# Patient Record
Sex: MALE | Race: WHITE | NOT HISPANIC OR LATINO | Employment: FULL TIME | URBAN - METROPOLITAN AREA
[De-identification: names, ages, dates, MRNs, and addresses within clinical notes are randomized per-mention and may not be internally consistent; named-entity substitution may affect disease eponyms.]

---

## 2021-08-03 NOTE — PROGRESS NOTES
Marciano Burden is a 50 year old who is being evaluated via a billable video visit.      How would you like to obtain your AVS? Mail a copy  If the video visit is dropped, the invitation should be resent by: Send to e-mail at: [email or web address]  Will anyone else be joining your video visit? No    Does patient have any form of state insurance? No    Do you have wifi? Yes  Do you have a smart phone? Yes  Can you download an hallie on your phone comfortably with out assistance? Yes  Can you watch a [employer] video? Yes          Nila Sosa MA    Video-Visit Details    Type of service:  Video Visit  Video Start Time: 11:05 AM  Video End Time: 11:55 AM  Originating Location (pt. Location): Home    Distant Location (provider location): [facility]   Platform used for Video Visit: [employer]       [facility]  Sleep Consultation Note     Date on this visit: 8/4/2021    Marciano Burden is sent by No ref. provider found for a sleep consultation regarding abnormal movements during sleep.    Primary Physician: No Ref-Primary, Physician     Chief complaint: Leg movements, body jerking, leg cramps    Marciano Burden 50 year old male                   who presents for evaluation and management of abnormal movements during sleep.  His girlfriend was also present during the sleep clinic visit.    He was previously being followed through the [facility].  He underwent sleep studies through [facility] at [facility] He was last seen by Dr. Aurelio Alvarez at the [facility] on December 21, 2020 to discuss the polysomnogram results.    SLEEP STUDY SUMMARY:  Full night treatment polysomnogram was a therapeutic trial of nasal CPAP delivered through a [employer]  full face mask interface (only one used during COVID-19 pandemic). Obstructive sleep disordered breathing and snoring were well controlled utilizing a terminal nasal CPAP pressure of 6 cm water, including  26 minutes of sampled supine REM sleep. Total sleep time was 377.5 minutes, with sleep efficiency of 80.2 %.  Frequent periodic leg movements of sleep with substantial arousal tendency were noted. 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    CLINICAL INTERPRETATION:  Satisfactory trial of nasal CPAP at 6 cm water. Frequent periodic leg movements of sleep with substantial arousal tendency, which in the clinical context of the patient's disturb sleep quality, could support a diagnosis of periodic limb movement disorder. Isolated REM sleep atonia loss (REM sleep without atonia) of uncertain clinical significance.    Isolated REM sleep without atonia was noted during the sleep study, but did not meet the standard for diagnosis of REM sleep behavior disorders since he did not have a clear-cut dream enactment behaviors and his partner also verified it.  With  symptom of intermittent left arm tremor and findings by previous providers including Dr. Alvarez, there was no clear evidence of parkinsonism and there was only a mild postural tremor for which he was suggested to follow up with neurology on annual basis.    He was offered therapeutic trial of pramipexole based on the symptoms of nonrestorative sleep quality and excessive PLM's during the sleep study, that could reflect a correlate of PLMD.    Due to concern about mild degree of chronic sleep insufficiency,  he has also suggested sleep expansion since he had reported habitually getting 7 hours of sleep during the week.    He reports  body twitching on and off all night.  He reports his arms and legs jerk, it has been getting worse lately.  The leg tenses up and he starts kicking.  Blankets  on his bed are disheveled.  There are no triggering factors for the symptoms.  He also describes waves of achiness through  his lower extremity from above the knee to the arches of the feet, leading to cramping in the feet sometimes , that he needs to stretch out his feet and to help relieve the cramp.  He reports uncontrolled tremors/muscle spasm.  He goes to bed around 10 PM and the symptoms usually start approximately 15 minutes after he goes to bed, but  sometimes it can be  longer than 15 minutes. He needs to constantly move and stretch out his legs. He cannot stay still when watching TV. Sometimes he gets out of the bed and walk, which occasionally helps .  Patient reports that he has tried dozens of different medications including muscle relaxants.  Muscle relaxants are somewhat helpful.  He was prescribed pramipexole by Dr. Aurelio Alvarez, for PLMD. He tried pramipexole, dose up to 3 tablets of the 0.125 mg, did not notice any change using it for 6 weeks and hence he discontinued the medication.  He tried trazodone that did not help. He tried gabapentin that did not make any difference. Had tried Sinemet, Requip without much success.    On an average, he reports getting 7 to 8 hours of sleep per night . He reports that his sleep is disrupted because of the symptoms. He reports nonrestorative sleep and fatigue, but denies excessive daytime sleepiness. His schedule does not allow him to nap during the day.  He denies drowsiness while driving.    SLEEP SCALES:  Patient's [place] Sleepiness score 8/24   Insomnia severity index:14    He was diagnosed with ZULY about 3 years ago. He reports using the CPAP regularly during sleep.  Compliance download was unavailable.     He denies sleepwalking or dream enactment behavior.  He reports night eating infrequently(he is fully awake during these episodes).    He has been on venlafaxine  mg at bedtime for anxiety for the past 6 months.  He has been on atorvastatin  for the past 3 years.  He takes lisinopril/hydrochlorothiazide 20/25 mg daily.    Allergies:    Allergies   Allergen Reactions     Penicillins        Medications:   Venlafaxine  mg at bedtime.  Atorvastatin 40 mg daily  Lisinopril/hydrochlorothiazide 20/25 mg daily.    Problem List:  There are no problems to display for this patient.       Past Medical/Surgical History:  ZULY  PLMD  Generalized anxiety disorder  Hypertension  Allergy rhinitis    Per Care everywhere:  NASAL SEPTOPLASTY 1992 - 1992   COLONOSCOPY 2001 - 2001   OTHER SURGICAL HISTORY polypectomy     Family History: Per care everywhere  Medical History Relation Comments   Heart failure Father  of CHF   Hyperlipidemia Father     Hypertension Father     Obesity Father     Sleep apnea Father     Lung cancer Maternal Grandmother     Tobacco Use Maternal Grandmother     Anxiety disorder Mother panic attacks   Parkinsonism Mother Started with tremors and had lots of testing   Anxiety depression Sister On meds   Anxiety disorder Son     Colon cancer Neg Hx     Diabetes Neg Hx       Social History(Per care everywhere)  Tobacco Use Types Packs/Day Years Used Date   Former Smoker Cigarettes 1.5 14 1986 -    Smokeless Tobacco: Never Used           Alcohol Use Standard Drinks/Week Comments   Yes 6 (1 standard drink = 0.6 oz pure alcohol)       Alcohol Habits Answer Date Recorded   How often do you have a drink containing alcohol? 2-3 times a week 2021   How many drinks containing alcohol do you have on a typical day when you are drinking? 3 or 4 2021   How often do you have six or more drinks on one occasion? Monthly 2021   Comment: Not asked       Social Isolation Answer Date Recorded   In a typical week, how many times do you talk on the phone with family, friends, or neighbors? Three times a week 2021   How often do you get together with friends or relatives? Three times a week 2021   How often  do you attend Oriental orthodox or Congregation services? 1 to 4 times per year 06/24/2021   Do you belong to any clubs or organizations such as Oriental orthodox groups, unions, fraternal or athletic groups, or school groups? No 06/24/2021   How often do you attend meetings of the clubs or organizations you belong to? Never 09/13/2019   Are you now , , , , never  or living with a partner?  06/24/2021     Physical Activity Answer Date Recorded   On average, how many days per week do you engage in moderate to strenuous exercise (like walking fast, running, jogging, dancing, swimming, biking, or other activities that cause a light or heavy sweat)? 1 day 06/24/2021   On average, how many minutes do you engage in exercise at this level? 60 min 06/24/2021     Stress Answer Date Recorded   Do you feel stress - tense, restless, nervous, or anxious, or unable to sleep at night because your mind is troubled all the time - these days? Only a little 06/24/2021     Financial Resource Strain Answer Date Recorded   How hard is it for you to pay for the very basics like food, housing, medical care, and heating? Not very hard 06/24/2021     Food Insecurity Answer Date Recorded   Within the past 12 months, you worried that your food would run out before you got money to buy more. Sometimes true 06/24/2021   Within the past 12 months, the food you bought just didn't last and you didn't have money to get more. Never true 06/24/2021     Transportation Needs Answer Date Recorded   In the past 12 months, has lack of transportation kept you from medical appointments or from getting medications? No 06/24/2021   In the past 12 months, has lack of transportation kept you from meetings, work, or getting things needed for daily living? No 06/24/2021     Housing Stability Answer Date Recorded   In the last 12 months, was there a time when you were not able to pay the mortgage or rent on time? No 06/24/2021   In the last 12  months, how many places have you lived? 1 06/24/2021   In the last 12 months, was there a time when you did not have a steady place to sleep or slept in a shelter (including now)? No 06/24/2021     Education Answer Date Recorded   What is the highest level of school you have completed or the highest degree you have received? Associate degree: occupational, technical, or vocational program 06/21/2019          Physical Examination:  Vitals: There were no vitals taken for this visit.  BMI= There is no height or weight on file to calculate BMI.       [place] Total Score 8/3/2021   Total score - [place] 8     General: No apparent distress, appropriately groomed  Head: Normocephalic, atraumatic   Chest: No cough, no audible wheezing, able to talk in full sentences  Skin: no rash  Psych: coherent speech, normal rate and volume, able to articulate logical thoughts, able   to abstract reason, no tangential thoughts, no hallucinations   or delusions  His affect is normal  Neuro:  Mental status: Alert and  Oriented X 3  Speech: normal   Gait: Normal    No focal neurological deficit      Impression/Plan:   RLS/PLMD/Leg Cramps: Recommend obtaining fasting serum ferritin level and will consider oral iron supplementation if ferritin level is less than 75 ng/mL.  Venlafaxine can be associated with PLM's. He was started about 6 months ago for anxiety, but he has had the symptoms long  before he was started on venlafaxine. Recommended him to switch the timing of administration of venlafaxine to morning instead of at bedtime.  Recommended over-the-counter magnesium supplement 100 to 200 mg at bedtime.  Nonpharmacological measures to control the RLS were discussed.  Therapeutic trial with Rotigotine patch is an option since he has not benefited from pramipexole and ropinirole in the past.  Therapeutic trial with benzodiazepine clonazepam is another option to control PLM/RLS/anxiety/promote sleep. But will first wait to obtain the iron  "function tests.    ZULY: Recommended patient to use CPAP regularly during sleep and instructed to get the supplies for the device replaced.  Patient was instructed to bring his CPAP device to enable us to retrieve the CPAP compliance download.    Sleep hygiene: Patient was instructed to avoid consuming alcohol before bed.  We also discussed avoiding using electronics before bed.    Hyperlipidemia: He is on atorvastatin.  Most of his symptoms seem to correlate around the time when he was started on atorvastatin.  Patient was recommended to hold off on using atorvastatin for 1 month and follow-up at the sleep clinic for a video visit.    Hypertension: He is on lisinopril/hydrochlorothiazide 20/25 mg once daily.    Generalized anxiety disorder: He is being treated with venlafaxine.    CC: No ref. provider found    The above note was dictated using voice recognition software. Although reviewed after completion, some word and grammatical error may remain . Please contact the author for any clarifications.    \"I spent a total of 60 minutes with Marciano Burden during today's  Video visi, most  of this time was spent counseling the patient and  coordinating care regarding PLMD/RLS / leg cramps ,ZULY, sleep hygiene, statin medication, anxiety,  weight management , going over previous sleep study reports and chart review., including documentation and further activities as noted above.\"     Buddy Palmer MD  [address] [address] [phone]  Dept: [phone]          "

## 2021-08-03 NOTE — PATIENT INSTRUCTIONS

## 2021-08-04 ENCOUNTER — VIRTUAL VISIT (OUTPATIENT)
Dept: SLEEP MEDICINE | Facility: CLINIC | Age: 51
End: 2021-08-04
Payer: COMMERCIAL

## 2021-08-04 DIAGNOSIS — E83.19 OTHER DISORDERS OF IRON METABOLISM: Primary | ICD-10-CM

## 2021-08-04 PROCEDURE — 99205 OFFICE O/P NEW HI 60 MIN: CPT | Mod: 95 | Performed by: INTERNAL MEDICINE

## 2021-09-14 ENCOUNTER — TELEPHONE (OUTPATIENT)
Dept: SLEEP MEDICINE | Facility: CLINIC | Age: 51
End: 2021-09-14

## 2021-09-14 NOTE — TELEPHONE ENCOUNTER
Called to notified patient Dr. Stallworth place lab order. Lab orders e-mail to patient instructed to have labs done at his primary once complete fax result to us and schedule a 1-month follow up to go over result. Reach out to [facility] to fax over compliance & therapy report. Message sent to Dr. Stallworth in regards to discontinuing a medication and starting a new medication.         Nila Sosa [facility]

## 2021-09-14 NOTE — TELEPHONE ENCOUNTER
----- Message from Buddy Palmer MD sent at 9/14/2021  8:30 AM CDT -----  Regarding: RE: Unsign note  Dawson Dotson, the visit  note has been signed off.     Thanks,   Mary     ----- Message -----  From: Nila Sosa MA  Sent: 8/16/2021   2:25 PM CDT  To: Buddy Palmer MD  Subject: Unsign note                                      Dawson Stallworth,      Please review encounter 8/4/2021. Once review is completed and closed please let me know.             Thanks,     CLIF Ferrari [facility]